# Patient Record
Sex: MALE | Race: WHITE | URBAN - METROPOLITAN AREA
[De-identification: names, ages, dates, MRNs, and addresses within clinical notes are randomized per-mention and may not be internally consistent; named-entity substitution may affect disease eponyms.]

---

## 2024-03-05 ENCOUNTER — OFFICE VISIT (OUTPATIENT)
Age: 34
End: 2024-03-05

## 2024-03-05 VITALS
WEIGHT: 219 LBS | SYSTOLIC BLOOD PRESSURE: 128 MMHG | TEMPERATURE: 98.5 F | DIASTOLIC BLOOD PRESSURE: 78 MMHG | OXYGEN SATURATION: 97 % | HEART RATE: 81 BPM

## 2024-03-05 DIAGNOSIS — R10.31 RIGHT LOWER QUADRANT ABDOMINAL PAIN: Primary | ICD-10-CM

## 2024-03-05 ASSESSMENT — ENCOUNTER SYMPTOMS
ABDOMINAL PAIN: 1
DIARRHEA: 0
NAUSEA: 0
VOMITING: 0

## 2024-03-05 NOTE — PROGRESS NOTES
Donal Lebron (:  1990) is a 33 y.o. male, New patient, here for evaluation of the following chief complaint(s):  Abdominal Pain (On both sides of abdomen.Symptoms started yesterday.)      ASSESSMENT/PLAN:    ICD-10-CM    1. Right lower quadrant abdominal pain  R10.31         Ddx -appendicitis, ureterolithiasis, UTI, other  Discussed with patient that I am concerned for appendicitis given the location of his pain and tenderness on exam.  [place] [facility] [place] and CT scan of the abdomen and pelvis.  He stated he could not go now because he had to go to work.  I discussed with him the time sensitive nature of this and discussed that the inflammation and infection in his appendix (if he has appendicitis) will worsen, could make him very sick, and he could even die if left untreated.  I recommended he go to ED ASAP.  He states he will try to go after work.  He is in appropriate mental capacity to make this decision.  I gave him the address to [facility].  Can take Tylenol and ibuprofen as needed for pain  He understood.    SUBJECTIVE/OBJECTIVE:  Patient presents for right lower quadrant abdominal pain that started yesterday and has been progressively worsening.  Pain has been constant with no alleviating or aggravating factors.  He took ibuprofen around 6am this morning.  Denies vomiting or nausea or diarrhea.  Denies dysuria or hematuria or frequency.  Denies fever.  No health problems.  No hx of abdominal surgery      History provided by:  Patient      Vitals:    24 0921   BP: 128/78   Site: Left Upper Arm   Position: Sitting   Cuff Size: Medium Adult   Pulse: 81   Temp: 98.5 °F (36.9 °C)   TempSrc: Oral   SpO2: 97%   Weight: 99.3 kg (219 lb)     Review of Systems   Constitutional:  Negative for fever.   Gastrointestinal:  Positive for abdominal pain. Negative for diarrhea, nausea and vomiting.   Genitourinary:  Negative for dysuria, frequency and hematuria.

## 2024-03-05 NOTE — PATIENT INSTRUCTIONS
-I am concerned for appendicitis, which is an inflammation and infection of your appendix.  You need to go to [facility] Emergency Department ASAP for lab work and a CT scan of your abdomen/pelvis to further evaluate.  If left untreated, appendicitis can make you very sick and you could die    [facility] Emergency Department is located at [address] [place] [address]